# Patient Record
Sex: FEMALE | Race: WHITE
[De-identification: names, ages, dates, MRNs, and addresses within clinical notes are randomized per-mention and may not be internally consistent; named-entity substitution may affect disease eponyms.]

---

## 2019-08-12 ENCOUNTER — HOSPITAL ENCOUNTER (OUTPATIENT)
Dept: HOSPITAL 10 - L-D | Age: 24
LOS: 1 days | Discharge: HOME | End: 2019-08-13
Attending: OBSTETRICS & GYNECOLOGY
Payer: COMMERCIAL

## 2019-08-12 ENCOUNTER — HOSPITAL ENCOUNTER (OUTPATIENT)
Dept: HOSPITAL 91 - OBT | Age: 24
LOS: 1 days | Discharge: HOME | End: 2019-08-13
Payer: COMMERCIAL

## 2019-08-12 VITALS — SYSTOLIC BLOOD PRESSURE: 103 MMHG | HEART RATE: 53 BPM | DIASTOLIC BLOOD PRESSURE: 69 MMHG | RESPIRATION RATE: 18 BRPM

## 2019-08-12 VITALS — BODY MASS INDEX: 30.18 KG/M2 | HEIGHT: 61 IN | WEIGHT: 159.84 LBS

## 2019-08-12 DIAGNOSIS — Z3A.37: ICD-10-CM

## 2019-08-12 LAB
ADD UMIC: NO
UR ASCORBIC ACID: NEGATIVE MG/DL
UR BILIRUBIN (DIP): NEGATIVE MG/DL
UR BLOOD (DIP): NEGATIVE MG/DL
UR CLARITY: CLEAR
UR COLOR: YELLOW
UR GLUCOSE (DIP): NEGATIVE MG/DL
UR KETONES (DIP): (no result) MG/DL
UR LEUKOCYTE ESTERASE (DIP): NEGATIVE LEU/UL
UR NITRITE (DIP): NEGATIVE MG/DL
UR PH (DIP): 6 (ref 5–9)
UR SPECIFIC GRAVITY (DIP): 1.01 (ref 1–1.03)
UR TOTAL PROTEIN (DIP): NEGATIVE MG/DL
UR UROBILINOGEN (DIP): NEGATIVE MG/DL

## 2019-08-12 PROCEDURE — 76815 OB US LIMITED FETUS(S): CPT

## 2019-08-12 PROCEDURE — 76818 FETAL BIOPHYS PROFILE W/NST: CPT

## 2019-08-12 PROCEDURE — 81003 URINALYSIS AUTO W/O SCOPE: CPT

## 2019-08-12 PROCEDURE — G0463 HOSPITAL OUTPT CLINIC VISIT: HCPCS

## 2019-08-12 NOTE — PN
Triage Information


Date/Time


2019


Reason for visit:  Uterine contractions (q 20 minutes throughout the day. No 


bleeding or leaking. +FM.)


Weeks of Gestation


37w 2d


/Para


6/3


Diabetes:  none


Hypertention:  none


Additional information


PMHx: none.


PSHx:  x 3. LSC for an ectopic.


NKDA.





Objective





Vital Signs


  Date      Temp  Pulse  Resp  B/P (MAP)   Pulse Ox  O2          O2 Flow    FiO2


Time                                                 Delivery    Rate


   19  98.0     53    18      103/69            Room Air


     20:59                           (80)





Fetal Heart Rate:  120's


Fetal Heart Rate Comments


Accels to 150 BPM. No decels.


Contractions:  >10 Minutes Apart


Exam


Closed/30%/-2





Results/Medications


Results 24 hrs





Laboratory Tests


                    Test
                     19
20:30


                    Urine Color               YELLOW


                    Urine Clarity             CLEAR


                    Urine pH                          6.0


                    Urine Specific Gravity          1.008


                    Urine Ketones                     1+  H


                    Urine Nitrite             NEGATIVE


                    Urine Bilirubin           NEGATIVE


                    Urine Urobilinogen        NEGATIVE


                    Urine Leukocyte Esterase  NEGATIVE


                    Urine Hemoglobin          NEGATIVE


                    Urine Glucose             NEGATIVE


                    Urine Total Protein       NEGATIVE





Imaging Results


EFW 2853 grams . VTX. PELON 9.2 cm. BPP 8/8. Anterior placenta.


Disposition:  Discharge


Assessment/Plan


A: IUP at 37w 2d.


False labor.


P: P.o hydration and may d/c home when pt feels the UC's have spaced out a lot 


and feels better. Pt to keep her appt at her new clinic , as scheduled. She 


is changing clinics so she can get a BTL with her repeat .











ERIN OVALLES MD             Aug 12, 2019 23:16

## 2019-08-13 NOTE — TRIAGE
===================================

OB Triage

===================================

Datetime Report Generated by CPN: 2019 09:14

   

   

===========================

Datetime: 2019 21:54

===========================

   

 Stage of Pregnancy:  OB Triage

   

===================================

Vaginal Exam

===================================

   

 Dilatation (cms):  0.0

 Effacement (%):  30

 Station:  -2

 Exam By:  E Ken

 Membrane Status:  Intact

 Vaginal Bleeding:  None

 Cervix, Consistency:  Moderate

 Cervix, Position:  Posterior

 Fetal Presentation 'A':  Cephalic

   

===========================

Datetime: 2019 21:44

===========================

   

 Stage of Pregnancy:  OB Triage

   

===================================

Labor Evaluation

===================================

   

 Frequency:  5-20

 Monitor Mode:  External

 Quality:  Mild

 Pattern:  Normal: <= 5 Contractions in 10 Minutes

 Resting Tone Orr:  Relaxed

   

===================================

Fetal Heart Rate

===================================

   

 FHR Baseline Rate:  140

 Monitor Mode:  External US

 FHR Baseline Changes:  No Baseline Change

 Variability:  Moderate 6-25 bpm

 Accelerations:  15X15

 Decelerations:  None

 Category:  Category I

   

===========================

Datetime: 2019 20:45

===========================

   

 Time of Arrival:  2019 19:03

 EGA:  37.2

 Arrived By:  Wheelchair

 Arrived From:  Home

 Chief Complaint:   w/ hx c/s x3 c/o ucs

 Fetal Movement:  Present

 Contractions:  Irregular

 Time Contractions Began:  2019 07:00

 Contractions:  q20

 Rupture of Membranes:  Denies

 Vaginal Bleeding:  None

 Vaginal Discharge:  Denies

 Recent Sexual Intercouse:  Denies

 Abdominal Trauma:  Not Applicable

 Patient Complaints:  Contractions

 Additional Patient Complaints:  Pt states just switched care to [facility]

 Time Provider Notified:  2019 20:50

 Provider Notified:  Dr Chaparro

 Initial Plan:  UA, EFW,BPP,SVE

   

===========================

Datetime: 2019 20:36

===========================

   

 Stage of Pregnancy:  OB Triage

   

===================================

Maternal Assessment

===================================

   

 Level of Consciousness:  Keenly Alert, Responsive

 Headache:  Denies

 Blurred Vision:  No

 Respiratory Effort:  Unlabored

 Nausea/Vomiting:  Denies

 RUQ Epigastric Pain:  Denies

 Facial Edema:  None

   

===================================

Labor Evaluation

===================================

   

 Frequency:  placed

 Monitor Mode:  External

 Resting Tone Orr:  Relaxed

 Monitor Mode:  External US

 Comments:  

   

===================================

Pain Assessment

===================================

   

 Pain Scale:  5

 Pain Presence:  Intermittent

 Pain Type:  Contraction

 Pain Location:  Abdomen

## 2019-08-24 ENCOUNTER — HOSPITAL ENCOUNTER (INPATIENT)
Dept: HOSPITAL 91 - L-D | Age: 24
LOS: 2 days | Discharge: HOME | End: 2019-08-26
Payer: COMMERCIAL

## 2019-08-24 ENCOUNTER — HOSPITAL ENCOUNTER (INPATIENT)
Dept: HOSPITAL 10 - L-D | Age: 24
LOS: 2 days | Discharge: HOME | End: 2019-08-26
Attending: OBSTETRICS & GYNECOLOGY | Admitting: OBSTETRICS & GYNECOLOGY
Payer: COMMERCIAL

## 2019-08-24 VITALS
BODY MASS INDEX: 30.63 KG/M2 | WEIGHT: 162.26 LBS | HEIGHT: 61 IN | WEIGHT: 162.26 LBS | BODY MASS INDEX: 30.63 KG/M2 | HEIGHT: 61 IN

## 2019-08-24 VITALS — SYSTOLIC BLOOD PRESSURE: 97 MMHG | RESPIRATION RATE: 14 BRPM | DIASTOLIC BLOOD PRESSURE: 56 MMHG | HEART RATE: 62 BPM

## 2019-08-24 VITALS — SYSTOLIC BLOOD PRESSURE: 100 MMHG | RESPIRATION RATE: 18 BRPM | HEART RATE: 80 BPM | DIASTOLIC BLOOD PRESSURE: 56 MMHG

## 2019-08-24 VITALS — SYSTOLIC BLOOD PRESSURE: 103 MMHG | RESPIRATION RATE: 15 BRPM | DIASTOLIC BLOOD PRESSURE: 73 MMHG | HEART RATE: 70 BPM

## 2019-08-24 VITALS — RESPIRATION RATE: 16 BRPM | DIASTOLIC BLOOD PRESSURE: 62 MMHG | SYSTOLIC BLOOD PRESSURE: 110 MMHG

## 2019-08-24 VITALS — RESPIRATION RATE: 17 BRPM | DIASTOLIC BLOOD PRESSURE: 55 MMHG | HEART RATE: 59 BPM | SYSTOLIC BLOOD PRESSURE: 101 MMHG

## 2019-08-24 DIAGNOSIS — Z3A.39: ICD-10-CM

## 2019-08-24 DIAGNOSIS — O34.211: ICD-10-CM

## 2019-08-24 DIAGNOSIS — J45.909: ICD-10-CM

## 2019-08-24 DIAGNOSIS — Z30.2: ICD-10-CM

## 2019-08-24 LAB
ADD MAN DIFF?: NO
BASOPHIL #: 0 10^3/UL (ref 0–0.1)
BASOPHILS %: 0.3 % (ref 0–2)
EOSINOPHILS #: 0.1 10^3/UL (ref 0–0.5)
EOSINOPHILS %: 1.7 % (ref 0–7)
HEMATOCRIT: 37.7 % (ref 37–47)
HEMOGLOBIN: 12.5 G/DL (ref 12–16)
HEPATITIS B SURFACE ANTIGEN: NEGATIVE
HIV 1&2 ANTIBODY: NEGATIVE
IMMATURE GRANS #M: 0.01 10^3/UL (ref 0–0.03)
IMMATURE GRANS % (M): 0.2 % (ref 0–0.43)
INR: 0.89
LYMPHOCYTES #: 1.7 10^3/UL (ref 0.8–2.9)
LYMPHOCYTES %: 27.7 % (ref 15–51)
MEAN CORPUSCULAR HEMOGLOBIN: 28.9 PG (ref 29–33)
MEAN CORPUSCULAR HGB CONC: 33.2 G/DL (ref 32–37)
MEAN CORPUSCULAR VOLUME: 87.1 FL (ref 82–101)
MEAN PLATELET VOLUME: 10.7 FL (ref 7.4–10.4)
MONOCYTE #: 0.3 10^3/UL (ref 0.3–0.9)
MONOCYTES %: 5.7 % (ref 0–11)
NEUTROPHIL #: 3.9 10^3/UL (ref 1.6–7.5)
NEUTROPHILS %: 64.4 % (ref 39–77)
NUCLEATED RED BLOOD CELLS #: 0 10^3/UL (ref 0–0)
NUCLEATED RED BLOOD CELLS%: 0 /100WBC (ref 0–0)
PARTIAL THROMBOPLASTIN TIME: 30.1 SEC (ref 23–35)
PLATELET COUNT: 176 10^3/UL (ref 140–415)
PROTIME: 12.2 SEC (ref 11.9–14.9)
PT RATIO: 1
RAPID PLASMA REAGIN: NONREACTIVE
RED BLOOD COUNT: 4.33 10^6/UL (ref 4.2–5.4)
RED CELL DISTRIBUTION WIDTH: 14.2 % (ref 11.5–14.5)
WHITE BLOOD COUNT: 6 10^3/UL (ref 4.8–10.8)

## 2019-08-24 PROCEDURE — 85730 THROMBOPLASTIN TIME PARTIAL: CPT

## 2019-08-24 PROCEDURE — 86592 SYPHILIS TEST NON-TREP QUAL: CPT

## 2019-08-24 PROCEDURE — 86850 RBC ANTIBODY SCREEN: CPT

## 2019-08-24 PROCEDURE — 88302 TISSUE EXAM BY PATHOLOGIST: CPT

## 2019-08-24 PROCEDURE — 86901 BLOOD TYPING SEROLOGIC RH(D): CPT

## 2019-08-24 PROCEDURE — 0UT70ZZ RESECTION OF BILATERAL FALLOPIAN TUBES, OPEN APPROACH: ICD-10-PCS

## 2019-08-24 PROCEDURE — 85610 PROTHROMBIN TIME: CPT

## 2019-08-24 PROCEDURE — 80307 DRUG TEST PRSMV CHEM ANLYZR: CPT

## 2019-08-24 PROCEDURE — 87340 HEPATITIS B SURFACE AG IA: CPT

## 2019-08-24 PROCEDURE — 85025 COMPLETE CBC W/AUTO DIFF WBC: CPT

## 2019-08-24 PROCEDURE — 0UT70ZZ RESECTION OF BILATERAL FALLOPIAN TUBES, OPEN APPROACH: ICD-10-PCS | Performed by: OBSTETRICS & GYNECOLOGY

## 2019-08-24 PROCEDURE — 86703 HIV-1/HIV-2 1 RESULT ANTBDY: CPT

## 2019-08-24 PROCEDURE — 86900 BLOOD TYPING SEROLOGIC ABO: CPT

## 2019-08-24 RX ADMIN — KETOROLAC TROMETHAMINE 1 MG: 30 INJECTION, SOLUTION INTRAMUSCULAR at 13:40

## 2019-08-24 RX ADMIN — PYRIDOXINE HYDROCHLORIDE 1 MLS/HR: 100 INJECTION, SOLUTION INTRAMUSCULAR; INTRAVENOUS at 23:46

## 2019-08-24 RX ADMIN — ONDANSETRON HYDROCHLORIDE 1 MG: 2 INJECTION, SOLUTION INTRAMUSCULAR; INTRAVENOUS at 13:40

## 2019-08-24 RX ADMIN — KETOROLAC TROMETHAMINE 1 MG: 30 INJECTION, SOLUTION INTRAMUSCULAR at 21:44

## 2019-08-24 RX ADMIN — PYRIDOXINE HYDROCHLORIDE SCH MLS/HR: 100 INJECTION, SOLUTION INTRAMUSCULAR; INTRAVENOUS at 06:30

## 2019-08-24 RX ADMIN — PYRIDOXINE HYDROCHLORIDE 1 MLS/HR: 100 INJECTION, SOLUTION INTRAMUSCULAR; INTRAVENOUS at 07:41

## 2019-08-24 RX ADMIN — PYRIDOXINE HYDROCHLORIDE 1 MLS/HR: 100 INJECTION, SOLUTION INTRAMUSCULAR; INTRAVENOUS at 06:30

## 2019-08-24 RX ADMIN — KETOROLAC TROMETHAMINE PRN MG: 30 INJECTION, SOLUTION INTRAMUSCULAR at 21:44

## 2019-08-24 RX ADMIN — Medication 1 MLS/HR: at 14:03

## 2019-08-24 RX ADMIN — PYRIDOXINE HYDROCHLORIDE SCH MLS/HR: 100 INJECTION, SOLUTION INTRAMUSCULAR; INTRAVENOUS at 07:41

## 2019-08-24 RX ADMIN — PYRIDOXINE HYDROCHLORIDE SCH MLS/HR: 100 INJECTION, SOLUTION INTRAMUSCULAR; INTRAVENOUS at 23:46

## 2019-08-24 RX ADMIN — PYRIDOXINE HYDROCHLORIDE 1 MLS/HR: 100 INJECTION, SOLUTION INTRAMUSCULAR; INTRAVENOUS at 23:47

## 2019-08-24 RX ADMIN — KETOROLAC TROMETHAMINE PRN MG: 30 INJECTION, SOLUTION INTRAMUSCULAR at 13:40

## 2019-08-24 RX ADMIN — CEFAZOLIN SODIUM 1 MLS/HR: 2 SOLUTION INTRAVENOUS at 12:07

## 2019-08-24 RX ADMIN — Medication 1 MLS/HR: at 10:16

## 2019-08-25 VITALS — RESPIRATION RATE: 18 BRPM | SYSTOLIC BLOOD PRESSURE: 106 MMHG | DIASTOLIC BLOOD PRESSURE: 65 MMHG | HEART RATE: 99 BPM

## 2019-08-25 VITALS — SYSTOLIC BLOOD PRESSURE: 96 MMHG | RESPIRATION RATE: 18 BRPM | HEART RATE: 78 BPM | DIASTOLIC BLOOD PRESSURE: 55 MMHG

## 2019-08-25 VITALS — RESPIRATION RATE: 18 BRPM | DIASTOLIC BLOOD PRESSURE: 56 MMHG | SYSTOLIC BLOOD PRESSURE: 94 MMHG | HEART RATE: 80 BPM

## 2019-08-25 VITALS — HEART RATE: 73 BPM | DIASTOLIC BLOOD PRESSURE: 51 MMHG | SYSTOLIC BLOOD PRESSURE: 91 MMHG | RESPIRATION RATE: 16 BRPM

## 2019-08-25 VITALS — DIASTOLIC BLOOD PRESSURE: 56 MMHG | SYSTOLIC BLOOD PRESSURE: 101 MMHG | RESPIRATION RATE: 18 BRPM | HEART RATE: 71 BPM

## 2019-08-25 LAB
ADD MAN DIFF?: NO
BASOPHIL #: 0 10^3/UL (ref 0–0.1)
BASOPHILS %: 0.1 % (ref 0–2)
EOSINOPHILS #: 0.1 10^3/UL (ref 0–0.5)
EOSINOPHILS %: 1 % (ref 0–7)
HEMATOCRIT: 30 % (ref 37–47)
HEMOGLOBIN: 9.8 G/DL (ref 12–16)
IMMATURE GRANS #M: 0.03 10^3/UL (ref 0–0.03)
IMMATURE GRANS % (M): 0.3 % (ref 0–0.43)
LYMPHOCYTES #: 1.3 10^3/UL (ref 0.8–2.9)
LYMPHOCYTES %: 15.1 % (ref 15–51)
MEAN CORPUSCULAR HEMOGLOBIN: 29.2 PG (ref 29–33)
MEAN CORPUSCULAR HGB CONC: 32.7 G/DL (ref 32–37)
MEAN CORPUSCULAR VOLUME: 89.3 FL (ref 82–101)
MEAN PLATELET VOLUME: 10.8 FL (ref 7.4–10.4)
MONOCYTE #: 0.4 10^3/UL (ref 0.3–0.9)
MONOCYTES %: 4.8 % (ref 0–11)
NEUTROPHIL #: 6.8 10^3/UL (ref 1.6–7.5)
NEUTROPHILS %: 78.7 % (ref 39–77)
NUCLEATED RED BLOOD CELLS #: 0 10^3/UL (ref 0–0)
NUCLEATED RED BLOOD CELLS%: 0 /100WBC (ref 0–0)
PLATELET COUNT: 141 10^3/UL (ref 140–415)
RED BLOOD COUNT: 3.36 10^6/UL (ref 4.2–5.4)
RED CELL DISTRIBUTION WIDTH: 14.1 % (ref 11.5–14.5)
WHITE BLOOD COUNT: 8.7 10^3/UL (ref 4.8–10.8)

## 2019-08-25 RX ADMIN — PYRIDOXINE HYDROCHLORIDE SCH MLS/HR: 100 INJECTION, SOLUTION INTRAMUSCULAR; INTRAVENOUS at 05:46

## 2019-08-25 RX ADMIN — KETOROLAC TROMETHAMINE PRN MG: 30 INJECTION, SOLUTION INTRAMUSCULAR at 04:32

## 2019-08-25 RX ADMIN — HYDROCODONE BITARTRATE AND ACETAMINOPHEN 1 TAB: 5; 325 TABLET ORAL at 23:17

## 2019-08-25 RX ADMIN — IBUPROFEN 1 MG: 600 TABLET ORAL at 17:37

## 2019-08-25 RX ADMIN — KETOROLAC TROMETHAMINE 1 MG: 30 INJECTION, SOLUTION INTRAMUSCULAR at 04:32

## 2019-08-25 RX ADMIN — HYDROCODONE BITARTRATE AND ACETAMINOPHEN PRN TAB: 5; 325 TABLET ORAL at 23:17

## 2019-08-25 RX ADMIN — HYDROCODONE BITARTRATE AND ACETAMINOPHEN 1 TAB: 5; 325 TABLET ORAL at 09:22

## 2019-08-25 RX ADMIN — HYDROCODONE BITARTRATE AND ACETAMINOPHEN 1 TAB: 5; 325 TABLET ORAL at 17:37

## 2019-08-25 RX ADMIN — HYDROCODONE BITARTRATE AND ACETAMINOPHEN PRN TAB: 5; 325 TABLET ORAL at 09:22

## 2019-08-25 RX ADMIN — HYDROCODONE BITARTRATE AND ACETAMINOPHEN PRN TAB: 5; 325 TABLET ORAL at 13:33

## 2019-08-25 RX ADMIN — DIPHENHYDRAMINE HYDROCHLORIDE 1 MG: 50 INJECTION, SOLUTION INTRAMUSCULAR; INTRAVENOUS at 02:11

## 2019-08-25 RX ADMIN — PYRIDOXINE HYDROCHLORIDE 1 MLS/HR: 100 INJECTION, SOLUTION INTRAMUSCULAR; INTRAVENOUS at 05:46

## 2019-08-25 RX ADMIN — HYDROCODONE BITARTRATE AND ACETAMINOPHEN 1 TAB: 5; 325 TABLET ORAL at 13:33

## 2019-08-25 RX ADMIN — IBUPROFEN SCH MG: 600 TABLET ORAL at 17:37

## 2019-08-26 VITALS — RESPIRATION RATE: 18 BRPM | SYSTOLIC BLOOD PRESSURE: 96 MMHG | HEART RATE: 71 BPM | DIASTOLIC BLOOD PRESSURE: 54 MMHG

## 2019-08-26 VITALS — HEART RATE: 80 BPM | SYSTOLIC BLOOD PRESSURE: 103 MMHG | DIASTOLIC BLOOD PRESSURE: 61 MMHG | RESPIRATION RATE: 18 BRPM

## 2019-08-26 LAB
ADD MAN DIFF?: NO
BASOPHIL #: 0 10^3/UL (ref 0–0.1)
BASOPHILS %: 0.2 % (ref 0–2)
EOSINOPHILS #: 0.2 10^3/UL (ref 0–0.5)
EOSINOPHILS %: 1.5 % (ref 0–7)
HEMATOCRIT: 31.6 % (ref 37–47)
HEMOGLOBIN: 10.2 G/DL (ref 12–16)
IMMATURE GRANS #M: 0.02 10^3/UL (ref 0–0.03)
IMMATURE GRANS % (M): 0.2 % (ref 0–0.43)
LYMPHOCYTES #: 1.4 10^3/UL (ref 0.8–2.9)
LYMPHOCYTES %: 13.3 % (ref 15–51)
MEAN CORPUSCULAR HEMOGLOBIN: 29 PG (ref 29–33)
MEAN CORPUSCULAR HGB CONC: 32.3 G/DL (ref 32–37)
MEAN CORPUSCULAR VOLUME: 89.8 FL (ref 82–101)
MEAN PLATELET VOLUME: 10.7 FL (ref 7.4–10.4)
MONOCYTE #: 0.4 10^3/UL (ref 0.3–0.9)
MONOCYTES %: 3.4 % (ref 0–11)
NEUTROPHIL #: 8.7 10^3/UL (ref 1.6–7.5)
NEUTROPHILS %: 81.4 % (ref 39–77)
NUCLEATED RED BLOOD CELLS #: 0 10^3/UL (ref 0–0)
NUCLEATED RED BLOOD CELLS%: 0 /100WBC (ref 0–0)
PLATELET COUNT: 165 10^3/UL (ref 140–415)
RED BLOOD COUNT: 3.52 10^6/UL (ref 4.2–5.4)
RED CELL DISTRIBUTION WIDTH: 14.4 % (ref 11.5–14.5)
WHITE BLOOD COUNT: 10.6 10^3/UL (ref 4.8–10.8)

## 2019-08-26 RX ADMIN — HYDROCODONE BITARTRATE AND ACETAMINOPHEN PRN TAB: 5; 325 TABLET ORAL at 08:51

## 2019-08-26 RX ADMIN — IBUPROFEN SCH MG: 600 TABLET ORAL at 00:00

## 2019-08-26 RX ADMIN — IBUPROFEN 1 MG: 600 TABLET ORAL at 11:25

## 2019-08-26 RX ADMIN — IBUPROFEN SCH MG: 600 TABLET ORAL at 06:29

## 2019-08-26 RX ADMIN — IBUPROFEN 1 MG: 600 TABLET ORAL at 06:29

## 2019-08-26 RX ADMIN — IBUPROFEN SCH MG: 600 TABLET ORAL at 11:25

## 2019-08-26 RX ADMIN — MEASLES, MUMPS, AND RUBELLA VIRUS VACCINE LIVE 1 ML: 1000; 12500; 1000 INJECTION, POWDER, LYOPHILIZED, FOR SUSPENSION SUBCUTANEOUS at 13:39

## 2019-08-26 RX ADMIN — IBUPROFEN 1 MG: 600 TABLET ORAL at 00:00

## 2019-08-26 RX ADMIN — HYDROCODONE BITARTRATE AND ACETAMINOPHEN 1 TAB: 5; 325 TABLET ORAL at 08:51

## 2019-08-26 RX ADMIN — CLOSTRIDIUM TETANI TOXOID ANTIGEN (FORMALDEHYDE INACTIVATED), CORYNEBACTERIUM DIPHTHERIAE TOXOID ANTIGEN (FORMALDEHYDE INACTIVATED), BORDETELLA PERTUSSIS TOXOID ANTIGEN (GLUTARALDEHYDE INACTIVATED), BORDETELLA PERTUSSIS FILAMENTOUS HEMAGGLUTININ ANTIGEN (FORMALDEHYDE INACTIVATED), BORDETELLA PERTUSSIS PERTACTIN ANTIGEN, AND BORDETELLA PERTUSSIS FIMBRIAE 2/3 ANTIGEN 1 ML: 5; 2; 2.5; 5; 3; 5 INJECTION, SUSPENSION INTRAMUSCULAR at 13:39
